# Patient Record
Sex: MALE | Race: WHITE | Employment: OTHER | URBAN - NONMETROPOLITAN AREA
[De-identification: names, ages, dates, MRNs, and addresses within clinical notes are randomized per-mention and may not be internally consistent; named-entity substitution may affect disease eponyms.]

---

## 2017-01-23 ENCOUNTER — OFFICE VISIT (OUTPATIENT)
Dept: FAMILY MEDICINE CLINIC | Age: 52
End: 2017-01-23

## 2017-01-23 VITALS
BODY MASS INDEX: 23.03 KG/M2 | SYSTOLIC BLOOD PRESSURE: 138 MMHG | HEART RATE: 80 BPM | DIASTOLIC BLOOD PRESSURE: 70 MMHG | HEIGHT: 73 IN | WEIGHT: 173.8 LBS

## 2017-01-23 DIAGNOSIS — J01.90 ACUTE NON-RECURRENT SINUSITIS, UNSPECIFIED LOCATION: ICD-10-CM

## 2017-01-23 DIAGNOSIS — I10 ESSENTIAL HYPERTENSION: Primary | ICD-10-CM

## 2017-01-23 DIAGNOSIS — M72.0 DUPUYTREN'S CONTRACTURE OF BOTH HANDS: Chronic | ICD-10-CM

## 2017-01-23 PROCEDURE — 99213 OFFICE O/P EST LOW 20 MIN: CPT | Performed by: FAMILY MEDICINE

## 2017-01-23 PROCEDURE — G8420 CALC BMI NORM PARAMETERS: HCPCS | Performed by: FAMILY MEDICINE

## 2017-01-23 PROCEDURE — G8484 FLU IMMUNIZE NO ADMIN: HCPCS | Performed by: FAMILY MEDICINE

## 2017-01-23 PROCEDURE — 4004F PT TOBACCO SCREEN RCVD TLK: CPT | Performed by: FAMILY MEDICINE

## 2017-01-23 PROCEDURE — G8427 DOCREV CUR MEDS BY ELIG CLIN: HCPCS | Performed by: FAMILY MEDICINE

## 2017-01-23 RX ORDER — SILDENAFIL 100 MG/1
100 TABLET, FILM COATED ORAL PRN
Qty: 30 TABLET | Refills: 1 | Status: SHIPPED | OUTPATIENT
Start: 2017-01-23

## 2017-01-23 RX ORDER — AMOXICILLIN AND CLAVULANATE POTASSIUM 500; 125 MG/1; MG/1
1 TABLET, FILM COATED ORAL 2 TIMES DAILY
Qty: 20 TABLET | Refills: 0 | Status: SHIPPED | OUTPATIENT
Start: 2017-01-23 | End: 2017-02-02

## 2017-01-23 RX ORDER — METOPROLOL SUCCINATE 50 MG/1
50 TABLET, EXTENDED RELEASE ORAL DAILY
Qty: 90 TABLET | Refills: 1 | Status: SHIPPED | OUTPATIENT
Start: 2017-01-23

## 2017-01-23 RX ORDER — LISINOPRIL AND HYDROCHLOROTHIAZIDE 20; 12.5 MG/1; MG/1
1 TABLET ORAL DAILY
Qty: 90 TABLET | Refills: 1 | Status: SHIPPED | OUTPATIENT
Start: 2017-01-23

## 2017-01-23 ASSESSMENT — ENCOUNTER SYMPTOMS
SINUS PRESSURE: 1
COUGH: 1
SHORTNESS OF BREATH: 0
GASTROINTESTINAL NEGATIVE: 1
ORTHOPNEA: 0
SORE THROAT: 1

## 2021-07-09 ENCOUNTER — HOSPITAL ENCOUNTER (EMERGENCY)
Age: 56
Discharge: HOME | End: 2021-07-09
Payer: COMMERCIAL

## 2021-07-09 VITALS
HEART RATE: 91 BPM | TEMPERATURE: 98 F | DIASTOLIC BLOOD PRESSURE: 110 MMHG | WEIGHT: 210 LBS | SYSTOLIC BLOOD PRESSURE: 158 MMHG | HEIGHT: 73 IN | BODY MASS INDEX: 27.83 KG/M2 | OXYGEN SATURATION: 98 % | RESPIRATION RATE: 18 BRPM

## 2021-07-09 DIAGNOSIS — M79.89 SWELLING OF RIGHT HAND: Primary | ICD-10-CM

## 2021-07-09 PROCEDURE — 99212 OFFICE O/P EST SF 10 MIN: CPT | Performed by: NURSE PRACTITIONER

## 2021-07-09 PROCEDURE — 99203 OFFICE O/P NEW LOW 30 MIN: CPT

## 2021-07-09 ASSESSMENT — ENCOUNTER SYMPTOMS
NAUSEA: 0
VOMITING: 0
SHORTNESS OF BREATH: 0

## 2021-07-09 NOTE — ED PROVIDER NOTES
[address]  Urgent Care Encounter       CHIEF COMPLAINT       Chief Complaint   Patient presents with    Other     right hand swelling       Nurses Notes reviewed and I agree except as noted in the HPI. HISTORY OF PRESENT ILLNESS   Karla Carvalho is a 55 y.o. male who presents with complaints of right hand swelling, onset today. Patient states he was out mowing his grass this afternoon and did not have any issues. When he finished mowing the grass he came in the house and noticed that his hands were itching. Shortly thereafter the right hand quickly became swollen. He denies any injury to the hand. He denies any bites or stings to the hand. There is no pain or erythema. Range of motion is not decreased although he does state that his hand feels very tight. 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 REVIEW OF SYSTEMS     Review of Systems   Constitutional: Negative for fever. Respiratory: Negative for shortness of breath. Cardiovascular: Negative for chest pain and leg swelling. Gastrointestinal: Negative for nausea and vomiting. Musculoskeletal:        Right hand swelling   Skin: Negative for rash and wound. Neurological: Negative for numbness. PAST MEDICAL HISTORY         Diagnosis Date    Hypertension        SURGICALHISTORY     Patient  has a past surgical history that includes Finger surgery (Left).     CURRENT MEDICATIONS       Discharge Medication List as of 7/9/2021  5:08 PM      CONTINUE these medications which have NOT CHANGED    Details   metoprolol succinate (TOPROL XL) 50 MG extended release tablet Take 1 tablet by mouth daily, Disp-90 tablet, R-1      lisinopril-hydrochlorothiazide (PRINZIDE;ZESTORETIC) 20-12.5 MG per tablet Take 1 tablet by mouth daily, Disp-90 tablet, R-1      sildenafil (VIAGRA) 100 MG tablet Take 1 tablet by mouth as needed for Erectile Dysfunction, Disp-30 tablet, R-1 meloxicam (MOBIC) 15 MG tablet Take 1 tablet by mouth daily as needed for Pain (knee), Disp-30 tablet, R-2             ALLERGIES     Patient is has No Known Allergies. Patients   There is no immunization history on file for this patient. FAMILY HISTORY     Patient's family history includes Cancer in his father; Diabetes in his mother; Rheum Arthritis in his father. SOCIAL HISTORY     Patient  reports that he has never smoked. His smokeless tobacco use includes chew. PHYSICAL EXAM     ED TRIAGE VITALS  BP: (!) 158/110, Temp: 98 °F (36.7 °C), Pulse: 91, Resp: 18, SpO2: 98 %,Estimated body mass index is 27.71 kg/m² as calculated from the following:    Height as of this encounter: 6' 1\" (1.854 m). Weight as of this encounter: 210 lb (95.3 kg). ,No LMP for male patient. Physical Exam  Vitals and nursing note reviewed. Constitutional:       General: He is not in acute distress. Appearance: He is well-developed. HENT:      Head: Normocephalic and atraumatic. Cardiovascular:      Rate and Rhythm: Normal rate and regular rhythm. Pulses:           Radial pulses are 2+ on the right side. Heart sounds: Normal heart sounds, S1 normal and S2 normal.   Pulmonary:      Effort: Pulmonary effort is normal. No respiratory distress. Breath sounds: Normal breath sounds and air entry. Musculoskeletal:      Left hand: Swelling (3+ pitting edema to the dorsum of the right hand extending just into the wrist.  Swelling also noted to the palmar surface. No tenderness, erythema or warmth. No obvious injuries. ) present. No tenderness. Decreased range of motion (mild due to swelling). Normal strength. Normal pulse. Skin:     General: Skin is warm and dry. Neurological:      General: No focal deficit present. Mental Status: He is alert and oriented to person, place, and time.    Psychiatric:         Mood and Affect: Mood normal.         Speech: Speech normal.         Behavior: Behavior normal. Behavior is cooperative. DIAGNOSTIC RESULTS     Labs:No results found for this visit on 07/09/21. IMAGING:    No orders to display         EKG:      URGENT CARE COURSE:     Vitals:    07/09/21 1626   BP: (!) 158/110   Pulse: 91   Resp: 18   Temp: 98 °F (36.7 °C)   TempSrc: Temporal   SpO2: 98%   Weight: 210 lb (95.3 kg)   Height: 6' 1\" (1.854 m)       Medications - No data to display         PROCEDURES:  None    FINAL IMPRESSION      1. Swelling of right hand          DISPOSITION/ PLAN     Patient presents with a sudden onset of swelling to the right hand. No known injury, sting or bite to the hand. Patient was advised that possibility of a blood clot in the wrist or forearm could be leading to the swelling and recommended ER transfer and evaluation. Patient declined this at this time and stated that he would go home and ice elevate the hand and and see how it does. He did say that if swelling got worse or if it was still swollen in the morning, that he would go to the emergency room for evaluation. Patient was advised on the potential of pulmonary embolus or CVA if this is indeed a clot and it embolizes this. Patient also instructed to go directly to the emergency room if he develops any sudden chest pain, shortness of breath or any weakness of the extremities, confusion or any difficulty speaking or walking. Wife is present for this conversation. Further instructions were outlined verbally and in the patient's discharge instructions. All the patient's questions were answered. The patient/parent agreed with the plan and was discharged from the [facility] in good condition.       PATIENT REFERRED TO:  MD Scott DentJenner [address] / [place] [address]      DISCHARGE MEDICATIONS:  Discharge Medication List as of 7/9/2021  5:08 PM          Discharge Medication List as of 7/9/2021  5:08 PM          Discharge Medication List as of 7/9/2021  5:08 PM          DEVIKA Dickinson - CNP    (Please note that portions of this note were completed with a voice recognition program. Efforts were made to edit the dictations but occasionally words are mis-transcribed.)         Sonia Ervin, DEVIKA - CNP  07/09/21 2038

## 2021-07-09 NOTE — ED NOTES
Patient discharge instructions given to pt and pt verbalized understanding, no other needs at this time, and pt left in stable condition.      Syed Ledbetter RN  07/09/21 0404

## 2021-07-09 NOTE — ED TRIAGE NOTES
Pt to urgent care due to right hand swelling with no known injury. Pt was riding his mower and when he finished he noticed his hand was swollen. He denies any pain at this time. He states it \"feels tight\".

## 2021-07-10 ENCOUNTER — HOSPITAL ENCOUNTER (EMERGENCY)
Age: 56
Discharge: HOME | End: 2021-07-10
Attending: FAMILY MEDICINE
Payer: COMMERCIAL

## 2021-07-10 VITALS
RESPIRATION RATE: 18 BRPM | TEMPERATURE: 98.2 F | SYSTOLIC BLOOD PRESSURE: 168 MMHG | DIASTOLIC BLOOD PRESSURE: 112 MMHG | OXYGEN SATURATION: 99 % | HEART RATE: 107 BPM

## 2021-07-10 DIAGNOSIS — M79.89 SWELLING OF RIGHT HAND: Primary | ICD-10-CM

## 2021-07-10 DIAGNOSIS — L03.113 CELLULITIS OF RIGHT HAND: ICD-10-CM

## 2021-07-10 PROCEDURE — 99283 EMERGENCY DEPT VISIT LOW MDM: CPT

## 2021-07-10 PROCEDURE — 6370000000 HC RX 637 (ALT 250 FOR IP): Performed by: FAMILY MEDICINE

## 2021-07-10 PROCEDURE — 96372 THER/PROPH/DIAG INJ SC/IM: CPT

## 2021-07-10 PROCEDURE — 6360000002 HC RX W HCPCS: Performed by: FAMILY MEDICINE

## 2021-07-10 RX ORDER — METHYLPREDNISOLONE SODIUM SUCCINATE 125 MG/2ML
125 INJECTION, POWDER, LYOPHILIZED, FOR SOLUTION INTRAMUSCULAR; INTRAVENOUS ONCE
Status: COMPLETED | OUTPATIENT
Start: 2021-07-10 | End: 2021-07-10

## 2021-07-10 RX ORDER — CEPHALEXIN 500 MG/1
500 CAPSULE ORAL 2 TIMES DAILY
Qty: 10 CAPSULE | Refills: 0 | Status: SHIPPED | OUTPATIENT
Start: 2021-07-10 | End: 2021-07-15

## 2021-07-10 RX ORDER — PREDNISONE 50 MG/1
50 TABLET ORAL DAILY
Qty: 5 TABLET | Refills: 0 | Status: SHIPPED | OUTPATIENT
Start: 2021-07-10 | End: 2021-07-15

## 2021-07-10 RX ORDER — CEPHALEXIN 250 MG/1
500 CAPSULE ORAL ONCE
Status: COMPLETED | OUTPATIENT
Start: 2021-07-10 | End: 2021-07-10

## 2021-07-10 RX ADMIN — CEPHALEXIN 500 MG: 250 CAPSULE ORAL at 08:11

## 2021-07-10 RX ADMIN — METHYLPREDNISOLONE SODIUM SUCCINATE 125 MG: 125 INJECTION, POWDER, FOR SOLUTION INTRAMUSCULAR; INTRAVENOUS at 08:13

## 2021-07-10 ASSESSMENT — ENCOUNTER SYMPTOMS
ABDOMINAL DISTENTION: 0
FACIAL SWELLING: 0
SHORTNESS OF BREATH: 0
BLOOD IN STOOL: 0
COUGH: 0
NAUSEA: 0
CONSTIPATION: 0
TROUBLE SWALLOWING: 0
ABDOMINAL PAIN: 0
COLOR CHANGE: 0
VOMITING: 0
DIARRHEA: 0

## 2021-07-10 NOTE — ED TRIAGE NOTES
Pt to the ED with complaints of right arm swelling. Pt states that he was mowing his yard yesterday and his hands began to feel itchy. Pt states that later his right hand started swelling. Pt went to ambulatory care last night and they said that if it continues to get worse come to the ED for possible blood clot. Pt states that the swelling in his wrist is better today, but swelling in his hand is still there. Denies and pain at this time. Pt states that he is normally hypertensive and is supposed to take BP meds, but he ran out awhile ago and has not refilled them.
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
## 2021-07-10 NOTE — ED PROVIDER NOTES
 Number of children: None    Years of education: None    Highest education level: None   Occupational History    None   Tobacco Use    Smoking status: Never Smoker    Smokeless tobacco: Current User     Types: Chew   Substance and Sexual Activity    Alcohol use: None    Drug use: None    Sexual activity: None   Other Topics Concern    None   Social History Narrative    None     Social Determinants of Health     Financial Resource Strain:     Difficulty of Paying Living Expenses:    Food Insecurity:     Worried About Running Out of Food in the Last Year:     Ran Out of Food in the Last Year:    Transportation Needs:     Lack of Transportation (Medical):      Lack of Transportation (Non-Medical):    Physical Activity:     Days of Exercise per Week:     Minutes of Exercise per Session:    Stress:     Feeling of Stress :    Social Connections:     Frequency of Communication with Friends and Family:     Frequency of Social Gatherings with Friends and Family:     Attends [employer] Services:     Active Member of Clubs or Organizations:     Attends Club or Organization Meetings:     Marital Status:    Intimate Partner Violence:     Fear of Current or Ex-Partner:     Emotionally Abused:     Physically Abused:     Sexually Abused:       Family History   Problem Relation Age of Onset    Diabetes Mother     Cancer Father     Rheum Arthritis Father         PHYSICAL EXAM   VITAL SIGNS: BP (!) 168/112   Pulse 107   Temp 98.2 °F (36.8 °C)   Resp 18   SpO2 99%    Constitutional: Well developed, well nourished, no acute distress   Eyes: Pupils equally round and react to light, sclera nonicteric   HENT: Atraumatic, no trismus   Respiratory: Normal respiratory effort  Vascular: radial and ulna pulses 2+ equal bilaterally for upper extremities  Musculoskeletal: Moderate edema noted to right hand with overlying warmth; no pain of hand with passive stretch of fingers of right hand; swelling mostly on dorsum of right hand   Integument: Well hydrated, no petechiae; no discoloration or cyanosis of right  hand  Neurologic: Alert & oriented, no slurred speech, 5/5  strength noted to both hands   Psych: Pleasant affect       RADIOLOGY   No orders to display        Labs Reviewed - No data to display     ED COURSE & MEDICAL DECISION MAKING   Pertinent Labs & Imaging studies reviewed and interpreted. (See chart for details)   Vitals:    07/10/21 0722   BP: (!) 168/112   Pulse: 107   Resp: 18   Temp: 98.2 °F (36.8 °C)   SpO2: 99%        Differential Diagnosis: Occult fracture, extremity sprain and injury    FINAL IMPRESSION   1. Swelling of right hand    2. Cellulitis of right hand         PLAN   MDM - pt presents with likely allergic reaction to right hand, with possible early cellulitis. I did not see any signs of compartment syndrome or signs of abscess (no fluctuance). Pt has no fever, streaking ascending lymphangitis, will give prednisone and Keflex as Rx.      Electronically signed by: Fanta Barlow MD, 7/10/2021 9:31 AM        Epifanio Porter MD  07/10/21 1444

## 2022-12-15 ENCOUNTER — HOSPITAL ENCOUNTER (EMERGENCY)
Age: 57
Discharge: HOME | End: 2022-12-15

## 2022-12-15 VITALS
BODY MASS INDEX: 27.83 KG/M2 | RESPIRATION RATE: 20 BRPM | HEIGHT: 73 IN | TEMPERATURE: 97.1 F | HEART RATE: 126 BPM | OXYGEN SATURATION: 95 % | DIASTOLIC BLOOD PRESSURE: 123 MMHG | WEIGHT: 210 LBS | SYSTOLIC BLOOD PRESSURE: 178 MMHG

## 2022-12-15 DIAGNOSIS — J40 BRONCHITIS: Primary | ICD-10-CM

## 2022-12-15 DIAGNOSIS — R03.0 ELEVATED BLOOD PRESSURE READING: ICD-10-CM

## 2022-12-15 PROCEDURE — 99213 OFFICE O/P EST LOW 20 MIN: CPT

## 2022-12-15 RX ORDER — PREDNISONE 20 MG/1
40 TABLET ORAL DAILY
Qty: 10 TABLET | Refills: 0 | Status: SHIPPED | OUTPATIENT
Start: 2022-12-15 | End: 2022-12-20

## 2022-12-15 RX ORDER — PSEUDOEPHEDRINE HCL 30 MG
30 TABLET ORAL EVERY 4 HOURS PRN
COMMUNITY

## 2022-12-15 RX ORDER — DEXTROMETHORPHAN HYDROBROMIDE AND PROMETHAZINE HYDROCHLORIDE 15; 6.25 MG/5ML; MG/5ML
5 SYRUP ORAL 4 TIMES DAILY PRN
Qty: 118 ML | Refills: 0 | Status: SHIPPED | OUTPATIENT
Start: 2022-12-15 | End: 2022-12-22

## 2022-12-15 RX ORDER — ALBUTEROL SULFATE 90 UG/1
2 AEROSOL, METERED RESPIRATORY (INHALATION) 4 TIMES DAILY PRN
Qty: 18 G | Refills: 0 | Status: SHIPPED | OUTPATIENT
Start: 2022-12-15

## 2022-12-15 ASSESSMENT — ENCOUNTER SYMPTOMS
WHEEZING: 1
COUGH: 1
SINUS PRESSURE: 1
SHORTNESS OF BREATH: 0
SINUS PAIN: 1

## 2022-12-15 ASSESSMENT — PAIN - FUNCTIONAL ASSESSMENT: PAIN_FUNCTIONAL_ASSESSMENT: NONE - DENIES PAIN

## 2022-12-15 NOTE — DISCHARGE INSTRUCTIONS
Drink plenty of fluids    Promethazine DM as directed as needed for cough    Prednisone daily as directed    Albuterol 2 puffs every 6 hours as needed for cough, wheeze    Return for worsening cough, fever, shortness of breath, chest pain or any new concerns    Use Coricidin HBP for treatment of symptoms and avoid Sudafed routine use as this may be causing her elevated blood pressure reading today. This should be evaluated by family physician and possibly treated. Call the family residency clinic today for an appointment next week to establish care.

## 2022-12-15 NOTE — Clinical Note
Heidi Acuña was seen and treated in our emergency department on 12/15/2022. He may return to work on 12/19/2022. If you have any questions or concerns, please don't hesitate to call.       Phil Eric, DEVIKA - CNP

## 2022-12-15 NOTE — ED PROVIDER NOTES
[address]  Urgent Care Encounter       CHIEF COMPLAINT       Chief Complaint   Patient presents with    Cough    Sinusitis       Nurses Notes reviewed and I agree except as noted in the HPI. HISTORY OF PRESENT ILLNESS   Jeff Astorga is a 65 y.o. male who presents for complaints of cough, sinus pressure, chest congestion. No fever. No body aches or chills. He has fatigue. Patient reports that his sinus pain and pressure developed first.  He has been using Sudafed for treatment of the symptoms. The symptoms have improved. He states symptoms are now all in his chest.  He is coughing some green sputum occasionally. The cough is typically dry. Symptoms have been going on for 4 to 5 days. HPI    REVIEW OF SYSTEMS     Review of Systems   Constitutional:  Positive for fatigue. Negative for activity change, chills and fever. HENT:  Positive for congestion, sinus pressure and sinus pain. Respiratory:  Positive for cough and wheezing. Negative for shortness of breath. Cardiovascular:  Negative for chest pain. Neurological:  Positive for headaches. Negative for dizziness. PAST MEDICAL HISTORY         Diagnosis Date    Hypertension        SURGICALHISTORY     Patient  has a past surgical history that includes Finger surgery (Left).     CURRENT MEDICATIONS       Discharge Medication List as of 12/15/2022 10:14 AM        CONTINUE these medications which have NOT CHANGED    Details   pseudoephedrine (SUDAFED) 30 MG tablet Take 30 mg by mouth every 4 hours as needed for CongestionHistorical Med      metoprolol succinate (TOPROL XL) 50 MG extended release tablet Take 1 tablet by mouth daily, Disp-90 tablet, R-1      lisinopril-hydrochlorothiazide (PRINZIDE;ZESTORETIC) 20-12.5 MG per tablet Take 1 tablet by mouth daily, Disp-90 tablet, R-1      sildenafil (VIAGRA) 100 MG tablet Take 1 tablet by mouth as needed for Erectile Dysfunction, Disp-30 tablet, R-1      meloxicam (MOBIC) 15 MG tablet Take 1 tablet by mouth daily as needed for Pain (knee), Disp-30 tablet, R-2             ALLERGIES     Patient is has No Known Allergies. Patients   There is no immunization history on file for this patient. FAMILY HISTORY     Patient's family history includes Cancer in his father; Diabetes in his mother; Rheum Arthritis in his father. SOCIAL HISTORY     Patient  reports that he has never smoked. His smokeless tobacco use includes chew. He reports current alcohol use. He reports that he does not use drugs. PHYSICAL EXAM     ED TRIAGE VITALS  BP: (!) 178/123, Temp: 97.1 °F (36.2 °C), Heart Rate: (!) 126, Resp: 20, SpO2: 95 %,Estimated body mass index is 27.71 kg/m² as calculated from the following:    Height as of this encounter: 6' 1\" (1.854 m). Weight as of this encounter: 210 lb (95.3 kg). ,No LMP for male patient. Physical Exam  Constitutional:       General: He is not in acute distress. Appearance: He is normal weight. He is ill-appearing. He is not toxic-appearing. HENT:      Head: Normocephalic. Nose: Congestion present. No rhinorrhea. Mouth/Throat:      Mouth: Mucous membranes are moist.      Pharynx: Oropharynx is clear. No oropharyngeal exudate. Cardiovascular:      Rate and Rhythm: Regular rhythm. Tachycardia present. Pulses: Normal pulses. Heart sounds: Normal heart sounds. Pulmonary:      Effort: Pulmonary effort is normal.      Breath sounds: Wheezing present. No rhonchi. Abdominal:      General: Abdomen is flat. Bowel sounds are normal. There is no distension. Palpations: Abdomen is soft. Tenderness: There is no abdominal tenderness. Skin:     General: Skin is warm and dry. Capillary Refill: Capillary refill takes less than 2 seconds. Neurological:      General: No focal deficit present. Mental Status: He is alert.    Psychiatric:         Mood and Affect: Mood normal.         Behavior: Behavior normal.       DIAGNOSTIC RESULTS     Labs:No results found for this visit on 12/15/22. IMAGING:    No orders to display         EKG:      URGENT CARE COURSE:     Vitals:    12/15/22 0957   BP: (!) 178/123   Pulse: (!) 126   Resp: 20   Temp: 97.1 °F (36.2 °C)   TempSrc: Temporal   SpO2: 95%   Weight: 210 lb (95.3 kg)   Height: 6' 1\" (1.854 m)       Medications - No data to display         PROCEDURES:  None    FINAL IMPRESSION      1. Bronchitis    2. Elevated blood pressure reading          DISPOSITION/ PLAN   Patient presents for what is likely bronchitis. Patient may have RSV or influenza but is out of the window for testing. He would be taken off work for the next couple of days. Medications as directed. Drink plenty of fluids. Follow-up with family residency clinic next week for reevaluation of his noted elevated blood pressure warning. He is advised to use Coricidin HBP rather than Sudafed.   Return for worsening cough, chest pain, shortness of breath or any new concerns        PATIENT REFERRED TO:  aCmilla Torres MD  [facility] [address] / [facility] [address]      DISCHARGE MEDICATIONS:  Discharge Medication List as of 12/15/2022 10:14 AM        START taking these medications    Details   predniSONE (DELTASONE) 20 MG tablet Take 2 tablets by mouth daily for 5 days, Disp-10 tablet, R-0Normal      albuterol sulfate HFA (VENTOLIN HFA) 108 (90 Base) MCG/ACT inhaler Inhale 2 puffs into the lungs 4 times daily as needed for Wheezing, Disp-18 g, R-0Normal      promethazine-dextromethorphan (PROMETHAZINE-DM) 6.25-15 MG/5ML syrup Take 5 mLs by mouth 4 times daily as needed for Cough, Disp-118 mL, R-0Normal             Discharge Medication List as of 12/15/2022 10:14 AM          Discharge Medication List as of 12/15/2022 10:14 AM          DEVIKA Nails CNP    (Please note that portions of this note were completed with a voice recognition program. Efforts were made to edit the dictations but occasionally words are mis-transcribed.)           Sukhwinder Herring, APRN - CNP  12/15/22 1018

## 2022-12-15 NOTE — ED NOTES
PT GIVEN DISCHARGE INSTRUCTIONS, VERBALIZES UNDERSTANDING. PT ASSESSMENT UNCHANGED, DISCHARGED IN STABLE CONDITION.          Yasmani Cole RN  12/15/22 0312

## 2022-12-15 NOTE — Clinical Note
Julia Segovia was seen and treated in our emergency department on 12/15/2022. He may return to work on 12/19/2022. If you have any questions or concerns, please don't hesitate to call.       Disha Mcelroy, APRN - CNP

## 2024-03-14 ENCOUNTER — HOSPITAL ENCOUNTER (EMERGENCY)
Age: 59
Discharge: HOME | End: 2024-03-14
Payer: COMMERCIAL

## 2024-03-14 VITALS
RESPIRATION RATE: 20 BRPM | BODY MASS INDEX: 26.78 KG/M2 | SYSTOLIC BLOOD PRESSURE: 157 MMHG | OXYGEN SATURATION: 96 % | DIASTOLIC BLOOD PRESSURE: 84 MMHG | HEART RATE: 92 BPM | WEIGHT: 203 LBS | TEMPERATURE: 98.9 F

## 2024-03-14 DIAGNOSIS — J01.00 ACUTE NON-RECURRENT MAXILLARY SINUSITIS: Primary | ICD-10-CM

## 2024-03-14 PROCEDURE — 99213 OFFICE O/P EST LOW 20 MIN: CPT

## 2024-03-14 PROCEDURE — 99213 OFFICE O/P EST LOW 20 MIN: CPT | Performed by: NURSE PRACTITIONER

## 2024-03-14 RX ORDER — PREDNISONE 20 MG/1
40 TABLET ORAL DAILY
Qty: 10 TABLET | Refills: 0 | Status: SHIPPED | OUTPATIENT
Start: 2024-03-14 | End: 2024-03-19

## 2024-03-14 RX ORDER — AMOXICILLIN AND CLAVULANATE POTASSIUM 875; 125 MG/1; MG/1
1 TABLET, FILM COATED ORAL 2 TIMES DAILY
Qty: 14 TABLET | Refills: 0 | Status: SHIPPED | OUTPATIENT
Start: 2024-03-14 | End: 2024-03-21

## 2024-03-14 RX ORDER — DEXTROMETHORPHAN HYDROBROMIDE AND PROMETHAZINE HYDROCHLORIDE 15; 6.25 MG/5ML; MG/5ML
5 SYRUP ORAL 4 TIMES DAILY PRN
Qty: 120 ML | Refills: 0 | Status: SHIPPED | OUTPATIENT
Start: 2024-03-14 | End: 2024-03-21

## 2024-03-14 ASSESSMENT — ENCOUNTER SYMPTOMS
COUGH: 1
SINUS PAIN: 1
RHINORRHEA: 1
SINUS PRESSURE: 1
SORE THROAT: 1

## 2024-03-14 ASSESSMENT — PAIN - FUNCTIONAL ASSESSMENT: PAIN_FUNCTIONAL_ASSESSMENT: NONE - DENIES PAIN

## 2024-03-14 NOTE — ED PROVIDER NOTES
[facility]  UrgentCare Encounter      CHIEFCOMPLAINT       Chief Complaint   Patient presents with    Head Congestion     Sinus pressure, cough       Nurses Notes reviewed and I agree except as noted in the HPI.  HISTORY OF PRESENT ILLNESS   Alfonso Nguyen is a 58 y.o. male who presents to urgent care with complaints of head congestion, sinus pressure, sinus congestion, postnasal drainage and persistent cough.  He states that he had symptoms for 11 days and seems to be getting worse rather than better.    REVIEW OF SYSTEMS     Review of Systems   HENT:  Positive for congestion, postnasal drip, rhinorrhea, sinus pressure, sinus pain and sore throat.    Respiratory:  Positive for cough.        PAST MEDICAL HISTORY         Diagnosis Date    Hypertension        SURGICAL HISTORY     Patient  has a past surgical history that includes Finger surgery (Left).    CURRENT MEDICATIONS       Previous Medications    ALBUTEROL SULFATE HFA (VENTOLIN HFA) 108 (90 BASE) MCG/ACT INHALER    Inhale 2 puffs into the lungs 4 times daily as needed for Wheezing    LISINOPRIL-HYDROCHLOROTHIAZIDE (PRINZIDE;ZESTORETIC) 20-12.5 MG PER TABLET    Take 1 tablet by mouth daily    MELOXICAM (MOBIC) 15 MG TABLET    Take 1 tablet by mouth daily as needed for Pain (knee)    METOPROLOL SUCCINATE (TOPROL XL) 50 MG EXTENDED RELEASE TABLET    Take 1 tablet by mouth daily    PSEUDOEPHEDRINE (SUDAFED) 30 MG TABLET    Take 30 mg by mouth every 4 hours as needed for Congestion    SILDENAFIL (VIAGRA) 100 MG TABLET    Take 1 tablet by mouth as needed for Erectile Dysfunction       ALLERGIES     Patient is has No Known Allergies.    FAMILY HISTORY     Patient'sfamily history includes Cancer in his father; Diabetes in his mother; Rheum Arthritis in his father.    SOCIAL HISTORY     Patient  reports that he has never smoked. His smokeless tobacco use includes chew. He reports current alcohol use. He reports that he does not use

## 2024-04-03 ENCOUNTER — OFFICE VISIT (OUTPATIENT)
Dept: FAMILY MEDICINE CLINIC | Age: 59
End: 2024-04-03
Payer: COMMERCIAL

## 2024-04-03 ENCOUNTER — HOSPITAL ENCOUNTER (OUTPATIENT)
Age: 59
Discharge: HOME | End: 2024-04-03
Payer: COMMERCIAL

## 2024-04-03 VITALS
DIASTOLIC BLOOD PRESSURE: 122 MMHG | SYSTOLIC BLOOD PRESSURE: 220 MMHG | RESPIRATION RATE: 16 BRPM | OXYGEN SATURATION: 97 % | HEIGHT: 73 IN | BODY MASS INDEX: 27.43 KG/M2 | TEMPERATURE: 97.8 F | WEIGHT: 207 LBS | HEART RATE: 114 BPM

## 2024-04-03 DIAGNOSIS — I10 ESSENTIAL HYPERTENSION: Primary | ICD-10-CM

## 2024-04-03 DIAGNOSIS — I10 ESSENTIAL HYPERTENSION: ICD-10-CM

## 2024-04-03 LAB
BASOPHILS ABSOLUTE: 0 THOU/MM3 (ref 0–0.1)
BASOPHILS NFR BLD AUTO: 0.3 %
DEPRECATED RDW RBC AUTO: 46.6 FL (ref 35–45)
EOSINOPHIL NFR BLD AUTO: 0.6 %
EOSINOPHILS ABSOLUTE: 0 THOU/MM3 (ref 0–0.4)
ERYTHROCYTE [DISTWIDTH] IN BLOOD BY AUTOMATED COUNT: 12.3 % (ref 11.5–14.5)
HCT VFR BLD AUTO: 45.2 % (ref 42–52)
HGB BLD-MCNC: 15.7 GM/DL (ref 14–18)
IMM GRANULOCYTES # BLD AUTO: 0.02 THOU/MM3 (ref 0–0.07)
IMM GRANULOCYTES NFR BLD AUTO: 0.3 %
LYMPHOCYTES ABSOLUTE: 1.6 THOU/MM3 (ref 1–4.8)
LYMPHOCYTES NFR BLD AUTO: 25.2 %
MCH RBC QN AUTO: 35.7 PG (ref 26–33)
MCHC RBC AUTO-ENTMCNC: 34.7 GM/DL (ref 32.2–35.5)
MCV RBC AUTO: 102.7 FL (ref 80–94)
MONOCYTES ABSOLUTE: 0.6 THOU/MM3 (ref 0.4–1.3)
MONOCYTES NFR BLD AUTO: 9 %
NEUTROPHILS NFR BLD AUTO: 64.6 %
NRBC BLD AUTO-RTO: 0 /100 WBC
PLATELET # BLD AUTO: 221 THOU/MM3 (ref 130–400)
PMV BLD AUTO: 9.5 FL (ref 9.4–12.4)
RBC # BLD AUTO: 4.4 MILL/MM3 (ref 4.7–6.1)
SEGMENTED NEUTROPHILS ABSOLUTE COUNT: 4.2 THOU/MM3 (ref 1.8–7.7)
WBC # BLD AUTO: 6.5 THOU/MM3 (ref 4.8–10.8)

## 2024-04-03 PROCEDURE — 36415 COLL VENOUS BLD VENIPUNCTURE: CPT

## 2024-04-03 PROCEDURE — 3080F DIAST BP >= 90 MM HG: CPT | Performed by: NURSE PRACTITIONER

## 2024-04-03 PROCEDURE — 80053 COMPREHEN METABOLIC PANEL: CPT

## 2024-04-03 PROCEDURE — 3017F COLORECTAL CA SCREEN DOC REV: CPT | Performed by: NURSE PRACTITIONER

## 2024-04-03 PROCEDURE — G8427 DOCREV CUR MEDS BY ELIG CLIN: HCPCS | Performed by: NURSE PRACTITIONER

## 2024-04-03 PROCEDURE — 4004F PT TOBACCO SCREEN RCVD TLK: CPT | Performed by: NURSE PRACTITIONER

## 2024-04-03 PROCEDURE — 99204 OFFICE O/P NEW MOD 45 MIN: CPT | Performed by: NURSE PRACTITIONER

## 2024-04-03 PROCEDURE — G8419 CALC BMI OUT NRM PARAM NOF/U: HCPCS | Performed by: NURSE PRACTITIONER

## 2024-04-03 PROCEDURE — 3077F SYST BP >= 140 MM HG: CPT | Performed by: NURSE PRACTITIONER

## 2024-04-03 PROCEDURE — 85025 COMPLETE CBC W/AUTO DIFF WBC: CPT

## 2024-04-03 RX ORDER — LISINOPRIL AND HYDROCHLOROTHIAZIDE 20; 12.5 MG/1; MG/1
1 TABLET ORAL DAILY
Qty: 30 TABLET | Refills: 0 | Status: SHIPPED | OUTPATIENT
Start: 2024-04-03

## 2024-04-03 RX ORDER — METOPROLOL SUCCINATE 50 MG/1
50 TABLET, EXTENDED RELEASE ORAL DAILY
Qty: 30 TABLET | Refills: 0 | Status: SHIPPED | OUTPATIENT
Start: 2024-04-03

## 2024-04-03 SDOH — ECONOMIC STABILITY: HOUSING INSECURITY
IN THE LAST 12 MONTHS, WAS THERE A TIME WHEN YOU DID NOT HAVE A STEADY PLACE TO SLEEP OR SLEPT IN A SHELTER (INCLUDING NOW)?: NO

## 2024-04-03 SDOH — ECONOMIC STABILITY: FOOD INSECURITY: WITHIN THE PAST 12 MONTHS, YOU WORRIED THAT YOUR FOOD WOULD RUN OUT BEFORE YOU GOT MONEY TO BUY MORE.: NEVER TRUE

## 2024-04-03 SDOH — ECONOMIC STABILITY: FOOD INSECURITY: WITHIN THE PAST 12 MONTHS, THE FOOD YOU BOUGHT JUST DIDN'T LAST AND YOU DIDN'T HAVE MONEY TO GET MORE.: NEVER TRUE

## 2024-04-03 SDOH — ECONOMIC STABILITY: INCOME INSECURITY: HOW HARD IS IT FOR YOU TO PAY FOR THE VERY BASICS LIKE FOOD, HOUSING, MEDICAL CARE, AND HEATING?: NOT HARD AT ALL

## 2024-04-03 ASSESSMENT — PATIENT HEALTH QUESTIONNAIRE - PHQ9
SUM OF ALL RESPONSES TO PHQ QUESTIONS 1-9: 0
1. LITTLE INTEREST OR PLEASURE IN DOING THINGS: NOT AT ALL
SUM OF ALL RESPONSES TO PHQ QUESTIONS 1-9: 0
SUM OF ALL RESPONSES TO PHQ QUESTIONS 1-9: 0
2. FEELING DOWN, DEPRESSED OR HOPELESS: NOT AT ALL
SUM OF ALL RESPONSES TO PHQ QUESTIONS 1-9: 0
SUM OF ALL RESPONSES TO PHQ9 QUESTIONS 1 & 2: 0

## 2024-04-03 ASSESSMENT — ENCOUNTER SYMPTOMS
VOMITING: 0
CHEST TIGHTNESS: 0
EYE PAIN: 0
NAUSEA: 0
COUGH: 0
SHORTNESS OF BREATH: 0
ABDOMINAL PAIN: 0

## 2024-04-03 NOTE — ASSESSMENT & PLAN NOTE
Uncontrolled, changes made today: will resume medication he was on previously and BP was controlled with this medication. Will check labs. Strongly advised patient if he has any red flag symptoms as we discussed he is to go to ER. Will follow up with me in one week.

## 2024-04-03 NOTE — PROGRESS NOTES
Constitutional:  Negative for chills and fever.   HENT:  Negative for congestion and sore throat.    Eyes:  Negative for pain and visual disturbance.   Respiratory:  Negative for cough, chest tightness and shortness of breath.    Cardiovascular:  Negative for chest pain and palpitations.   Gastrointestinal:  Negative for abdominal pain, nausea and vomiting.   Genitourinary:  Negative for decreased urine volume and dysuria.   Skin:  Negative for rash.   Neurological:  Negative for dizziness, weakness and headaches.   Psychiatric/Behavioral:  Negative for dysphoric mood. The patient is not nervous/anxious.        Physical Exam  Vitals and nursing note reviewed.   Constitutional:       Appearance: Normal appearance. He is normal weight.   HENT:      Head: Normocephalic.      Mouth/Throat:      Pharynx: No oropharyngeal exudate or posterior oropharyngeal erythema.   Eyes:      Conjunctiva/sclera: Conjunctivae normal.   Neck:      Vascular: No carotid bruit.   Cardiovascular:      Rate and Rhythm: Normal rate and regular rhythm.   Pulmonary:      Effort: Pulmonary effort is normal.      Breath sounds: Normal breath sounds.   Musculoskeletal:         General: Normal range of motion.      Cervical back: Neck supple.   Skin:     General: Skin is warm and dry.      Findings: No rash.   Neurological:      General: No focal deficit present.      Mental Status: He is alert and oriented to person, place, and time.   Psychiatric:         Mood and Affect: Mood normal.         Behavior: Behavior normal.         Thought Content: Thought content normal.         Judgment: Judgment normal.         Vitals:    04/03/24 1358 04/03/24 1420   BP: (!) 260/140 (!) 220/122   Site: Left Upper Arm Left Upper Arm   Position: Sitting Sitting   Cuff Size: Medium Adult Medium Adult   Pulse: (!) 114    Resp: 16    Temp: 97.8 °F (36.6 °C)    SpO2: 97%    Weight: 93.9 kg (207 lb)    Height: 1.854 m (6' 1\")               An electronic signature was

## 2024-04-04 ENCOUNTER — TELEPHONE (OUTPATIENT)
Dept: FAMILY MEDICINE CLINIC | Age: 59
End: 2024-04-04

## 2024-04-04 DIAGNOSIS — R79.89 ELEVATED LIVER FUNCTION TESTS: Primary | ICD-10-CM

## 2024-04-04 LAB
ALBUMIN SERPL BCG-MCNC: 4 G/DL (ref 3.5–5.1)
ALP SERPL-CCNC: 109 U/L (ref 38–126)
ALT SERPL W/O P-5'-P-CCNC: 51 U/L (ref 11–66)
ANION GAP SERPL CALC-SCNC: 15 MEQ/L (ref 8–16)
AST SERPL-CCNC: 132 U/L (ref 5–40)
BILIRUB SERPL-MCNC: 0.5 MG/DL (ref 0.3–1.2)
BUN SERPL-MCNC: 7 MG/DL (ref 7–22)
CALCIUM SERPL-MCNC: 9.3 MG/DL (ref 8.5–10.5)
CHLORIDE SERPL-SCNC: 103 MEQ/L (ref 98–111)
CO2 SERPL-SCNC: 19 MEQ/L (ref 23–33)
CREAT SERPL-MCNC: 0.7 MG/DL (ref 0.4–1.2)
GFR SERPL CREATININE-BSD FRML MDRD: > 90 ML/MIN/1.73M2
GLUCOSE SERPL-MCNC: 124 MG/DL (ref 70–108)
POTASSIUM SERPL-SCNC: 4.4 MEQ/L (ref 3.5–5.2)
PROT SERPL-MCNC: 7.7 G/DL (ref 6.1–8)
SODIUM SERPL-SCNC: 137 MEQ/L (ref 135–145)

## 2024-04-04 NOTE — TELEPHONE ENCOUNTER
----- Message from DEVIKA Eddy - CNP sent at 4/4/2024  9:11 AM EDT -----  Liver enzyme is elevated. I would like to get an ultrasound of the liver and some more labs. I will place the order for the labs for him to do when he gets a chance and someone will call him to schedule the US. Significantly decrease alcohol consumption. Other labs are good.

## 2024-04-11 ENCOUNTER — OFFICE VISIT (OUTPATIENT)
Dept: FAMILY MEDICINE CLINIC | Age: 59
End: 2024-04-11
Payer: COMMERCIAL

## 2024-04-11 VITALS
BODY MASS INDEX: 26.72 KG/M2 | HEART RATE: 87 BPM | OXYGEN SATURATION: 99 % | SYSTOLIC BLOOD PRESSURE: 142 MMHG | HEIGHT: 73 IN | TEMPERATURE: 97.4 F | DIASTOLIC BLOOD PRESSURE: 80 MMHG | RESPIRATION RATE: 16 BRPM | WEIGHT: 201.6 LBS

## 2024-04-11 DIAGNOSIS — I10 ESSENTIAL HYPERTENSION: Primary | ICD-10-CM

## 2024-04-11 DIAGNOSIS — F10.10 ALCOHOL ABUSE: ICD-10-CM

## 2024-04-11 DIAGNOSIS — R79.89 ELEVATED LIVER FUNCTION TESTS: ICD-10-CM

## 2024-04-11 PROCEDURE — 3017F COLORECTAL CA SCREEN DOC REV: CPT | Performed by: NURSE PRACTITIONER

## 2024-04-11 PROCEDURE — G8427 DOCREV CUR MEDS BY ELIG CLIN: HCPCS | Performed by: NURSE PRACTITIONER

## 2024-04-11 PROCEDURE — 3079F DIAST BP 80-89 MM HG: CPT | Performed by: NURSE PRACTITIONER

## 2024-04-11 PROCEDURE — G8419 CALC BMI OUT NRM PARAM NOF/U: HCPCS | Performed by: NURSE PRACTITIONER

## 2024-04-11 PROCEDURE — 4004F PT TOBACCO SCREEN RCVD TLK: CPT | Performed by: NURSE PRACTITIONER

## 2024-04-11 PROCEDURE — 3077F SYST BP >= 140 MM HG: CPT | Performed by: NURSE PRACTITIONER

## 2024-04-11 PROCEDURE — 99214 OFFICE O/P EST MOD 30 MIN: CPT | Performed by: NURSE PRACTITIONER

## 2024-04-11 NOTE — PROGRESS NOTES
[facility]  [address]  [facility] [address]  Dept: [phone]  Dept Fax: [phone]  Loc: [phone]     Visit Date:  4/11/2024    Patient:  Alfonso Nguyen  YOB: 1965  Age: 58 y.o.  Gender: male  BMI: Body mass index is 26.6 kg/m².    Alfonso Nguyen, Established patient, is being seen today for   Chief Complaint   Patient presents with    Hypertension     1 week f/u.  Pt stated he's feeling good.  Pt has been taking his meds.  Pt feels like meds are working.     .     Assessment/Plan      1. Essential hypertension  Assessment & Plan:   Borderline controlled, continue current medications, lifestyle modifications recommended, and will recheck in 2-3 weeks.  2. Elevated liver function tests  Assessment & Plan:    Patient has been decreasing alcohol intake. US liver has been ordered.  3. Alcohol abuse  Assessment & Plan:    Patient has been making progress on reducing alcohol intake and will continue to decrease it.    Return in about 2 weeks (around 4/25/2024) for 2 wk fu BP and complete DOT form.    HPI:     Significant improvement in BP.    BP Readings from Last 3 Encounters:  04/11/24 : (!) 142/80  04/03/24 : (!) 220/122  03/22/24 : (!) 224/124  States has been tolerating medication well and denies any adverse effects. States he feels better. No other concerns.          Discussed recent labs and patient states he has been working on decreasing his alcohol intake since we called him with the results. No abdominal pain, nause or vomiting, or change in stools.    Hypertension  This is a chronic problem. The current episode started more than 1 year ago. The problem has been rapidly improving since onset. Condition status: borderline controlled. Pertinent negatives include no anxiety, blurred vision, chest pain, headaches, malaise/fatigue, neck pain, orthopnea, palpitations, peripheral edema, PND, shortness of

## 2024-04-17 PROBLEM — F10.10 ALCOHOL ABUSE: Status: ACTIVE | Noted: 2024-04-17

## 2024-04-17 PROBLEM — R79.89 ELEVATED LIVER FUNCTION TESTS: Status: ACTIVE | Noted: 2024-04-17

## 2024-04-17 ASSESSMENT — ENCOUNTER SYMPTOMS
CHEST TIGHTNESS: 0
NAUSEA: 0
ORTHOPNEA: 0
COUGH: 0
EYE PAIN: 0
BLURRED VISION: 0
SORE THROAT: 0
ABDOMINAL PAIN: 0
VOMITING: 0
SHORTNESS OF BREATH: 0

## 2024-04-17 NOTE — ASSESSMENT & PLAN NOTE
Borderline controlled, continue current medications, lifestyle modifications recommended, and will recheck in 2-3 weeks.

## 2024-04-24 ENCOUNTER — OFFICE VISIT (OUTPATIENT)
Dept: FAMILY MEDICINE CLINIC | Age: 59
End: 2024-04-24
Payer: COMMERCIAL

## 2024-04-24 VITALS
RESPIRATION RATE: 16 BRPM | DIASTOLIC BLOOD PRESSURE: 70 MMHG | TEMPERATURE: 98.6 F | BODY MASS INDEX: 26.06 KG/M2 | SYSTOLIC BLOOD PRESSURE: 116 MMHG | OXYGEN SATURATION: 97 % | HEIGHT: 73 IN | HEART RATE: 84 BPM | WEIGHT: 196.6 LBS

## 2024-04-24 DIAGNOSIS — R73.9 ELEVATED BLOOD SUGAR: ICD-10-CM

## 2024-04-24 DIAGNOSIS — F10.10 ALCOHOL ABUSE: ICD-10-CM

## 2024-04-24 DIAGNOSIS — I10 ESSENTIAL HYPERTENSION: Primary | ICD-10-CM

## 2024-04-24 DIAGNOSIS — F17.210 CIGARETTE SMOKER: ICD-10-CM

## 2024-04-24 PROCEDURE — G8419 CALC BMI OUT NRM PARAM NOF/U: HCPCS | Performed by: NURSE PRACTITIONER

## 2024-04-24 PROCEDURE — 3078F DIAST BP <80 MM HG: CPT | Performed by: NURSE PRACTITIONER

## 2024-04-24 PROCEDURE — 3017F COLORECTAL CA SCREEN DOC REV: CPT | Performed by: NURSE PRACTITIONER

## 2024-04-24 PROCEDURE — 3074F SYST BP LT 130 MM HG: CPT | Performed by: NURSE PRACTITIONER

## 2024-04-24 PROCEDURE — 4004F PT TOBACCO SCREEN RCVD TLK: CPT | Performed by: NURSE PRACTITIONER

## 2024-04-24 PROCEDURE — G8427 DOCREV CUR MEDS BY ELIG CLIN: HCPCS | Performed by: NURSE PRACTITIONER

## 2024-04-24 PROCEDURE — 99214 OFFICE O/P EST MOD 30 MIN: CPT | Performed by: NURSE PRACTITIONER

## 2024-04-24 RX ORDER — LISINOPRIL AND HYDROCHLOROTHIAZIDE 20; 12.5 MG/1; MG/1
1 TABLET ORAL DAILY
Qty: 90 TABLET | Refills: 0 | Status: SHIPPED | OUTPATIENT
Start: 2024-04-24

## 2024-04-24 RX ORDER — METOPROLOL SUCCINATE 50 MG/1
50 TABLET, EXTENDED RELEASE ORAL DAILY
Qty: 90 TABLET | Refills: 0 | Status: SHIPPED | OUTPATIENT
Start: 2024-04-24

## 2024-04-24 ASSESSMENT — ENCOUNTER SYMPTOMS
ORTHOPNEA: 0
BLURRED VISION: 0
SHORTNESS OF BREATH: 0

## 2024-04-24 NOTE — PROGRESS NOTES
instructions.  Discussed use, benefit, and side effects of prescribed medications.  All patient questions answered.  Pt voiced understanding.  Reviewed health maintenance.       Electronically signed by DEVIKA Eddy CNP on 4/24/2024 at 8:47 AM EDT

## 2024-07-10 DIAGNOSIS — I10 ESSENTIAL HYPERTENSION: ICD-10-CM

## 2024-07-10 RX ORDER — METOPROLOL SUCCINATE 50 MG/1
50 TABLET, EXTENDED RELEASE ORAL DAILY
Qty: 90 TABLET | Refills: 0 | Status: SHIPPED | OUTPATIENT
Start: 2024-07-10

## 2024-07-10 RX ORDER — LISINOPRIL AND HYDROCHLOROTHIAZIDE 20; 12.5 MG/1; MG/1
1 TABLET ORAL DAILY
Qty: 90 TABLET | Refills: 0 | Status: SHIPPED | OUTPATIENT
Start: 2024-07-10

## 2024-07-10 NOTE — TELEPHONE ENCOUNTER
[facility] called requesting a refill on the following medications:  Requested Prescriptions     Pending Prescriptions Disp Refills    lisinopril-hydroCHLOROthiazide (PRINZIDE;ZESTORETIC) 20-12.5 MG per tablet 90 tablet 0     Sig: Take 1 tablet by mouth daily    metoprolol succinate (TOPROL XL) 50 MG extended release tablet 90 tablet 0     Sig: Take 1 tablet by mouth daily       Date of last visit: 4/24/2024  Date of next visit (if applicable):7/24/2024  Date of last refill: 4/24/2024  Pharmacy Name: [facility]      Thanks,  Robyn Danielle MA    
No

## 2024-08-31 NOTE — ED TRIAGE NOTES
Patient to room with c/o nonproductive cough, head congestion, and sinus pressure beginning 11 days ago.    - - -